# Patient Record
Sex: FEMALE | Race: WHITE | NOT HISPANIC OR LATINO | Employment: PART TIME | URBAN - METROPOLITAN AREA
[De-identification: names, ages, dates, MRNs, and addresses within clinical notes are randomized per-mention and may not be internally consistent; named-entity substitution may affect disease eponyms.]

---

## 2020-03-09 ENCOUNTER — COMMUNICATION - HEALTHEAST (OUTPATIENT)
Dept: SCHEDULING | Facility: CLINIC | Age: 60
End: 2020-03-09

## 2020-03-10 ENCOUNTER — OFFICE VISIT - HEALTHEAST (OUTPATIENT)
Dept: FAMILY MEDICINE | Facility: CLINIC | Age: 60
End: 2020-03-10

## 2020-03-10 DIAGNOSIS — H69.92 DYSFUNCTION OF LEFT EUSTACHIAN TUBE: ICD-10-CM

## 2020-03-10 DIAGNOSIS — H65.192 OTHER NON-RECURRENT ACUTE NONSUPPURATIVE OTITIS MEDIA OF LEFT EAR: ICD-10-CM

## 2020-09-09 ENCOUNTER — COMMUNICATION - HEALTHEAST (OUTPATIENT)
Dept: INTERNAL MEDICINE | Facility: CLINIC | Age: 60
End: 2020-09-09

## 2021-06-04 VITALS
TEMPERATURE: 98.5 F | HEART RATE: 87 BPM | RESPIRATION RATE: 14 BRPM | DIASTOLIC BLOOD PRESSURE: 84 MMHG | WEIGHT: 174 LBS | OXYGEN SATURATION: 95 % | SYSTOLIC BLOOD PRESSURE: 138 MMHG

## 2021-06-06 NOTE — TELEPHONE ENCOUNTER
Who is calling:  Patient   Reason for Call:  Wanting to see if Dr. Cesar is able to take on a new patient. Patient stated that her mother, Ingris Merino is one of her patients and referred her as a Dr.  Date of last appointment with primary care: patient would be new  Okay to leave a detailed message: Yes

## 2021-06-11 NOTE — TELEPHONE ENCOUNTER
New Appointment Needed  What is the reason for the visit:    Patient was scheduled 9/11 with you but needed to cancel due to work conflict, you have reached your daily max of 2 or 3 Physical a day. Wondering if you are willing to squeeze her in a different day?  Provider Preference: Dr. Cesar  How soon do you need to be seen?: Next available  Waitlist offered?: No  Okay to leave a detailed message:  Yes

## 2021-06-11 NOTE — TELEPHONE ENCOUNTER
Who is calling:  Patient   Reason for Call:  Est care physical with Dr. Cesar ok per previous encounter note -- See below  Date of last appointment with primary care:   Okay to leave a detailed message: Yes    Patient is returning call. Patient would like her establish care physical with Dr. Cesar on 11/18 at 1pm.     Writer tried to schedule the appointment but wasn't able to due to not having override access.     Please scheduled physical appointment on 11/18 at 1pm with Dr. Cesar as care connection / contact center is not able to due to not having override access.     Please call patient to notify that appointment has been scheduled.

## 2021-06-18 NOTE — PATIENT INSTRUCTIONS - HE
Patient Instructions by Isidro Zambrano PA-C at 3/10/2020  7:40 AM     Author: Isidro Zambrano PA-C Service: -- Author Type: Physician Assistant    Filed: 3/10/2020  8:07 AM Encounter Date: 3/10/2020 Status: Signed    : Isidro Zambrano PA-C (Physician Assistant)       Use of over-the-counter Zyrtec.  Follow packaging directions  Use of over-the-counter Flonase  Frequent swallowing drinking and chewing gum to help create low-grade suction on the eustachian tube.  Elevate head at nighttime so it does not increase pressure  Use of over-the-counter Tylenol as needed for comfort.  Return to primary care provider for reevaluation treatment if any complication or new symptoms should present.        Patient Education     Earache, No Infection (Adult)  Earaches can happen without an infection. This occurs when air and fluid build up behind the eardrum causing a feeling of fullness and discomfort and reduced hearing. This is called otitis media with effusion (OME) or serous otitis media. It means there is fluid in the middle ear. It is not the same as acute otitis media, which is typically from infection.  OME can happen when you have a cold if congestion blocks the passage that drains the middle ear. This passage is called the eustachian tube. OME may also occur with nasal allergies or after a bacterial middle ear infection.    The pain or discomfort may come and go. You may hear clicking or popping sounds when you chew or swallow. You may feel that your balance is off. Or you may hear ringing in the ear.  It often takes from several weeks up to 3 months for the fluid to clear on its own. Oral pain relievers and ear drops help if there is pain. Decongestants and antihistamines sometimes help. Antibiotics don't help since there is no infection. Your doctor may prescribe a nasal spray to help reduce swelling in the nose and eustachian tube. This can allow the ear to drain.  If your OME doesn't improve after 3 months, surgery  may be used to drain the fluid and insert a small tube in the eardrum to allow continued drainage.  Because the middle ear fluid can become infected, it is important to watch for signs of an ear infection which may develop later. These signs include increased ear pain, fever, or drainage from the ear.  Home care  The following guidelines will help you care for yourself at home:    You may use over-the-counter medicine as directed to control pain, unless another medicine was prescribed. If you have chronic liver or kidney disease or ever had a stomach ulcer or GI bleeding, talk with your doctor before using these medicines. Aspirin should never be used in anyone under 18 years of age who is ill with a fever. It may cause severe liver damage.    You may use over-the-counter decongestants such as phenylephrine or pseudoephedrine. But they are not always helpful. Don't use nasal spray decongestants more than 3 days. Longer use can make congestion worse. Prescription nasal sprays from your doctor don't typically have those restrictions.    Antihistamines may help if you are also having allergy symptoms.    You may use medicines such as guaifenesin to thin mucus and promote drainage.  Follow-up care  Follow up with your healthcare provider or as advised if you are not feeling better after 3 days.  When to seek medical advice  Call your healthcare provider right away if any of the following occur:    Your ear pain gets worse or does not start to improve     Fever of 100.4 F (38 C) or higher, or as directed by your healthcare provider    Fluid or blood draining from the ear    Headache or sinus pain    Stiff neck    Unusual drowsiness or confusion  Date Last Reviewed: 10/1/2016    7421-0534 [employer]. [address] [place] [address], [place] [address]. All rights reserved. This information is not intended as a substitute for professional medical care. Always follow your healthcare professional's instructions.

## 2021-06-28 NOTE — PROGRESS NOTES
Progress Notes by Isidro Zambrano PA-C at 3/10/2020  7:40 AM     Author: Isidro Zambrano PA-C Service: -- Author Type: Physician Assistant    Filed: 3/10/2020  8:24 AM Encounter Date: 3/10/2020 Status: Signed    : Isidro Zambrano PA-C (Physician Assistant)       Subjective:      Patient ID: Ethel Morales is a 59 y.o. female.    Chief Complaint:    HPI  Ethel Morales is a 59 y.o. female who presents today complaining of left-sided ear pain over the last week.  Patient denies any history of allergies.  She does not have runny nose cough sneezing congestion or cold or flulike symptoms.  She has had a history of sinus problems in the past.  She does not feel that she has any pressure over the maxillary sinuses but may be over the frontal sphenoid or ethmoid.  She has not tried treatment for this.  She does complain of popping or clicking when she chews and swallows and she also has decreased hearing on the left side.  No overt otorrhea hearing loss balance deficits or other complaints to address such as fever chills or night sweats.  She has not tried treatment for this over-the-counter.      No past medical history on file.    No past surgical history on file.    No family history on file.    Social History     Tobacco Use   ? Smoking status: Never Smoker   ? Smokeless tobacco: Never Used   Substance Use Topics   ? Alcohol use: Not on file   ? Drug use: Not on file       Review of Systems  As above in HPI, otherwise balance of Review of Systems are negative.    Objective:     /84   Pulse 87   Temp 98.5  F (36.9  C)   Resp 14   Wt 174 lb (78.9 kg)   SpO2 95%   Breastfeeding No     Physical Exam  General: Patient is resting comfortably no acute distress is afebrile  HEENT: Head is normocephalic atraumatic   eyes are PERRL EOMI sclera anicteric   TMs on the right is clear  EAC on the left is clear  TM on the left is with fluid in the middle ear and slight early erythema.  No overt effusion  EAC on the  left is with no cerumen  Throat is without pharyngeal wall erythema and no exudate  No cervical lymphadenopathy present  LUNGS: Clear to auscultation bilaterally  HEART: Regular rate and rhythm  Skin: Without rash non-diaphoretic        Assessment:     Procedures    The primary encounter diagnosis was Dysfunction of left eustachian tube. A diagnosis of Other non-recurrent acute nonsuppurative otitis media of left ear was also pertinent to this visit.    Plan:     1. Dysfunction of left eustachian tube  fluticasone propionate (FLONASE ALLERGY RELIEF) 50 mcg/actuation nasal spray    cetirizine (ZYRTEC) 10 MG tablet   2. Other non-recurrent acute nonsuppurative otitis media of left ear  amoxicillin (AMOXIL) 875 MG tablet       Had a conversation with the patient stating that she does have eustachian tube dysfunction bilaterally.  We will treat her for that.  Handout and conversation were gone over regarding that treatment.  Additionally we talked about holding a prescription for an antibiotic for ear infection.  Patient was requesting a treatment for an ear infection but advised her that the antibiotic would not help with congestion.  She will hold it if she should develop signs or symptoms of an ear infection she will then use the antibiotic and fill it at that later date.  Questions were answered to patient's satisfaction before discharge.    Patient Instructions   Use of over-the-counter Zyrtec.  Follow packaging directions  Use of over-the-counter Flonase  Frequent swallowing drinking and chewing gum to help create low-grade suction on the eustachian tube.  Elevate head at nighttime so it does not increase pressure  Use of over-the-counter Tylenol as needed for comfort.  Return to primary care provider for reevaluation treatment if any complication or new symptoms should present.        Patient Education     Earache, No Infection (Adult)  Earaches can happen without an infection. This occurs when air and fluid  build up behind the eardrum causing a feeling of fullness and discomfort and reduced hearing. This is called otitis media with effusion (OME) or serous otitis media. It means there is fluid in the middle ear. It is not the same as acute otitis media, which is typically from infection.  OME can happen when you have a cold if congestion blocks the passage that drains the middle ear. This passage is called the eustachian tube. OME may also occur with nasal allergies or after a bacterial middle ear infection.    The pain or discomfort may come and go. You may hear clicking or popping sounds when you chew or swallow. You may feel that your balance is off. Or you may hear ringing in the ear.  It often takes from several weeks up to 3 months for the fluid to clear on its own. Oral pain relievers and ear drops help if there is pain. Decongestants and antihistamines sometimes help. Antibiotics don't help since there is no infection. Your doctor may prescribe a nasal spray to help reduce swelling in the nose and eustachian tube. This can allow the ear to drain.  If your OME doesn't improve after 3 months, surgery may be used to drain the fluid and insert a small tube in the eardrum to allow continued drainage.  Because the middle ear fluid can become infected, it is important to watch for signs of an ear infection which may develop later. These signs include increased ear pain, fever, or drainage from the ear.  Home care  The following guidelines will help you care for yourself at home:    You may use over-the-counter medicine as directed to control pain, unless another medicine was prescribed. If you have chronic liver or kidney disease or ever had a stomach ulcer or GI bleeding, talk with your doctor before using these medicines. Aspirin should never be used in anyone under 18 years of age who is ill with a fever. It may cause severe liver damage.    You may use over-the-counter decongestants such as phenylephrine or  pseudoephedrine. But they are not always helpful. Don't use nasal spray decongestants more than 3 days. Longer use can make congestion worse. Prescription nasal sprays from your doctor don't typically have those restrictions.    Antihistamines may help if you are also having allergy symptoms.    You may use medicines such as guaifenesin to thin mucus and promote drainage.  Follow-up care  Follow up with your healthcare provider or as advised if you are not feeling better after 3 days.  When to seek medical advice  Call your healthcare provider right away if any of the following occur:    Your ear pain gets worse or does not start to improve     Fever of 100.4 F (38 C) or higher, or as directed by your healthcare provider    Fluid or blood draining from the ear    Headache or sinus pain    Stiff neck    Unusual drowsiness or confusion  Date Last Reviewed: 10/1/2016    3417-8572 [employer]. [address] [place] [address], [place] [address]. All rights reserved. This information is not intended as a substitute for professional medical care. Always follow your healthcare professional's instructions.

## 2021-07-15 ENCOUNTER — TRANSFERRED RECORDS (OUTPATIENT)
Dept: HEALTH INFORMATION MANAGEMENT | Facility: CLINIC | Age: 61
End: 2021-07-15

## 2021-07-29 ENCOUNTER — TRANSFERRED RECORDS (OUTPATIENT)
Dept: HEALTH INFORMATION MANAGEMENT | Facility: CLINIC | Age: 61
End: 2021-07-29

## 2021-11-18 ENCOUNTER — APPOINTMENT (OUTPATIENT)
Dept: URBAN - METROPOLITAN AREA CLINIC 260 | Age: 61
Setting detail: DERMATOLOGY
End: 2021-11-29

## 2021-11-18 VITALS — RESPIRATION RATE: 16 BRPM | WEIGHT: 165 LBS | HEIGHT: 67 IN

## 2021-11-18 DIAGNOSIS — L30.9 DERMATITIS, UNSPECIFIED: ICD-10-CM

## 2021-11-18 PROCEDURE — OTHER PRESCRIPTION: OTHER

## 2021-11-18 PROCEDURE — OTHER PRESCRIPTION MEDICATION MANAGEMENT: OTHER

## 2021-11-18 PROCEDURE — OTHER COUNSELING: OTHER

## 2021-11-18 PROCEDURE — OTHER MIPS QUALITY: OTHER

## 2021-11-18 PROCEDURE — 99204 OFFICE O/P NEW MOD 45 MIN: CPT

## 2021-11-18 RX ORDER — TRIAMCINOLONE ACETONIDE 1 MG/G
CREAM TOPICAL TWICE DAILY
Qty: 80 | Refills: 2 | Status: ERX | COMMUNITY
Start: 2021-11-18

## 2021-11-18 ASSESSMENT — LOCATION DETAILED DESCRIPTION DERM
LOCATION DETAILED: RIGHT LATERAL ABDOMEN
LOCATION DETAILED: LEFT MEDIAL BREAST 10-11:00 REGION
LOCATION DETAILED: RIGHT PROXIMAL POSTERIOR THIGH

## 2021-11-18 ASSESSMENT — LOCATION SIMPLE DESCRIPTION DERM
LOCATION SIMPLE: RIGHT POSTERIOR THIGH
LOCATION SIMPLE: LEFT BREAST
LOCATION SIMPLE: ABDOMEN

## 2021-11-18 ASSESSMENT — LOCATION ZONE DERM
LOCATION ZONE: TRUNK
LOCATION ZONE: LEG

## 2021-11-18 NOTE — HPI: RASH
What Type Of Note Output Would You Prefer (Optional)?: Standard Output
How Severe Is Your Rash?: mild
Is This A New Presentation, Or A Follow-Up?: Rash
Additional History: Patient reports this rash started appear on Sunday night on her right hip area then spreading to her chest and thighs areas.  Patient also reports, she was in Hotel in [place] on Sunday night with her  but her  doesn’t get any rash. Patient trying to wash it with alcohol and it helps calm down the itching away but it does not help taking the rash away. Patient also tried triple antibiotics with a little improvement.

## 2021-11-18 NOTE — PROCEDURE: PRESCRIPTION MEDICATION MANAGEMENT
Plan: - Avoid scratching \\n- Recommended Sarna lotion for itching \\n- Follow up in 2 weeks
Initiate Treatment: Triamcinolone 0.1% cream
Samples Given: Sarna lotions
Render In Strict Bullet Format?: No
Detail Level: Zone

## 2022-02-15 ENCOUNTER — OFFICE VISIT (OUTPATIENT)
Dept: FAMILY MEDICINE | Facility: CLINIC | Age: 62
End: 2022-02-15
Payer: COMMERCIAL

## 2022-02-15 VITALS
OXYGEN SATURATION: 99 % | HEART RATE: 58 BPM | RESPIRATION RATE: 16 BRPM | SYSTOLIC BLOOD PRESSURE: 149 MMHG | WEIGHT: 168 LBS | DIASTOLIC BLOOD PRESSURE: 80 MMHG | TEMPERATURE: 97.6 F

## 2022-02-15 DIAGNOSIS — L29.2 VULVOVAGINAL PRURITUS: Primary | ICD-10-CM

## 2022-02-15 DIAGNOSIS — N89.8 VAGINAL ITCHING: ICD-10-CM

## 2022-02-15 LAB
ALBUMIN UR-MCNC: NEGATIVE MG/DL
APPEARANCE UR: CLEAR
BACTERIA #/AREA URNS HPF: ABNORMAL /HPF
BILIRUB UR QL STRIP: NEGATIVE
CLUE CELLS: ABNORMAL
COLOR UR AUTO: YELLOW
GLUCOSE UR STRIP-MCNC: NEGATIVE MG/DL
HGB UR QL STRIP: NEGATIVE
KETONES UR STRIP-MCNC: NEGATIVE MG/DL
LEUKOCYTE ESTERASE UR QL STRIP: ABNORMAL
NITRATE UR QL: NEGATIVE
PH UR STRIP: 6.5 [PH] (ref 5–8)
RBC #/AREA URNS AUTO: ABNORMAL /HPF
SP GR UR STRIP: 1.01 (ref 1–1.03)
SQUAMOUS #/AREA URNS AUTO: ABNORMAL /LPF
TRICHOMONAS, WET PREP: ABNORMAL
UROBILINOGEN UR STRIP-ACNC: 0.2 E.U./DL
WBC #/AREA URNS AUTO: ABNORMAL /HPF
WBC'S/HIGH POWER FIELD, WET PREP: ABNORMAL
YEAST, WET PREP: ABNORMAL

## 2022-02-15 PROCEDURE — 81001 URINALYSIS AUTO W/SCOPE: CPT | Performed by: PHYSICIAN ASSISTANT

## 2022-02-15 PROCEDURE — 87210 SMEAR WET MOUNT SALINE/INK: CPT | Performed by: PHYSICIAN ASSISTANT

## 2022-02-15 PROCEDURE — 99213 OFFICE O/P EST LOW 20 MIN: CPT | Performed by: PHYSICIAN ASSISTANT

## 2022-02-15 RX ORDER — KETOCONAZOLE 20 MG/G
CREAM TOPICAL 2 TIMES DAILY
Qty: 30 G | Refills: 0 | Status: SHIPPED | OUTPATIENT
Start: 2022-02-15 | End: 2022-02-22

## 2022-02-15 NOTE — PROGRESS NOTES
Patient presents with:  Vaginal Problem: itching burning started on thursday       Clinical Decision Making:  A conversation with the patient stating that the urine test and the wet prep were returned as negative.  Patient will be treated empirically for vulvar vaginal vaginitis due to the redness and itching externally.  Patient did not admit to having lesions or vesicles that would be amenable to a culture.  Patient declined need for STD screening.  She was accompanied by her  in the office.  The  did not admit to any current symptoms. If the patient is not responding to empiric treatment she should follow up with her primary care provider in 1 week for reevaluation and treatment sooner if complications or new concerns arise in the interim.      ICD-10-CM    1. Vulvovaginal pruritus  L29.2 ketoconazole (NIZORAL) 2 % external cream   2. Vaginal itching  N89.8 UA macro with reflex to Microscopic and Culture - Clinc Collect     Wet prep - Clinic Collect     Urine Microscopic     ketoconazole (NIZORAL) 2 % external cream       Patient Instructions   Keep the area clean dry and protected  Apply the topical cream as directed.  Follow-up with your primary care provider if not getting Roberto Carlos percent resolution of new symptoms or concerns arise      Patient Education     Yeast Infection (Candida Vaginal Infection)    You have a Candida vaginal infection. This is also known as a yeast infection. It's most often caused by a type of yeast (fungus) called Candida. Candida are normally found in the vagina. But if they increase in number, this can lead to infection and cause symptoms.   Symptoms of a yeast infection can include:     Clumpy or thin, white discharge, which may look like cottage cheese    Itching or burning    Burning with urination  Certain factors can make a yeast infection more likely. These can include:     Taking certain medicines, such as antibiotics or birth control  pills    Pregnancy    Diabetes    Weak immune system  A yeast infection is most often treated with antifungal medicine. This may be given as a vaginal cream or pills you take by mouth. Treatment may last for about 1 to 7 days. Women with severe or recurrent infections may need longer courses of treatment.   Home care    If you re prescribed medicine, be sure to use it as directed. Finish all of the medicine, even if your symptoms go away. Don t try to treat yourself using over-the-counter products without talking with your provider first. They will let you know if this is a good option for you.    Ask your provider what steps you can take to help reduce your risk of having a yeast infection in the future.    Follow-up care  Follow up with your healthcare provider, or as directed.   When to seek medical advice  Call your healthcare provider right away if:     You have a fever of 100.4 F (38 C) or higher, or as directed by your provider.    Your symptoms worsen, or they don t go away within a few days of starting treatment.    You have new pain in the lower belly or pelvic region.    You have side effects that bother you or a reaction to the cream or pills you re prescribed.    You or any partners you have sex with have new symptoms, such as a rash, joint pain, or sores.  [employer] last reviewed this educational content on 7/1/2020 2000-2021 [employer]. All rights reserved. This information is not intended as a substitute for professional medical care. Always follow your healthcare professional's instructions.               HPI:  Ethel Morales is a 61 year old female who presents today for evaluation of vulvar redness and itching.  Patient does not admit to urinary symptoms to include urinary hesitancy urgency frequency but does have slight dysuria.  She has had some slight dysuria at the end of her urinary stream with a described burning at the outside portion of the urethra.  She has not had  vaginal discharge or odor.  No dyspareunia.  She is  to her  and has no other sexual partner.  Her  accompanies her in the office today and both the patient and  declined STD screening also staying with the  does not have symptoms or redness or lesions on the penis or scrotum at this time.    History obtained from chart review and the patient.    Problem List:  There are no relevant problems documented for this patient.      No past medical history on file.    Social History     Tobacco Use     Smoking status: Never Smoker     Smokeless tobacco: Never Used   Substance Use Topics     Alcohol use: Not on file       Review of Systems  As above in HPI otherwise negative.    Vitals:    02/15/22 1611   BP: (!) 149/80   Pulse: 58   Resp: 16   Temp: 97.6  F (36.4  C)   TempSrc: Oral   SpO2: 99%   Weight: 76.2 kg (168 lb)       General: Patient is resting comfortably no acute distress is afebrile  HEENT: Head is normocephalic atraumatic   eyes are PERRL EOMI sclera anicteric   Abdomen: Nontender nondistended no rebound no guarding no masses no suprapubic tenderness to palpation no rebound or guarding no masses no CVA tenderness to percussion  Skin: Without rash non-diaphoretic  : Deferred at request of the patient in the office today.    Physical Exam      Labs:  Results for orders placed or performed in visit on 02/15/22   UA macro with reflex to Microscopic and Culture - Clinc Collect     Status: Abnormal    Specimen: Urine, Clean Catch   Result Value Ref Range    Color Urine Yellow Colorless, Straw, Light Yellow, Yellow    Appearance Urine Clear Clear    Glucose Urine Negative Negative mg/dL    Bilirubin Urine Negative Negative    Ketones Urine Negative Negative mg/dL    Specific Gravity Urine 1.015 1.005 - 1.030    Blood Urine Negative Negative    pH Urine 6.5 5.0 - 8.0    Protein Albumin Urine Negative Negative mg/dL    Urobilinogen Urine 0.2 0.2, 1.0 E.U./dL    Nitrite Urine  Negative Negative    Leukocyte Esterase Urine Trace (A) Negative   Urine Microscopic     Status: Abnormal   Result Value Ref Range    Bacteria Urine None Seen None Seen /HPF    RBC Urine 0-2 0-2 /HPF /HPF    WBC Urine None Seen 0-5 /HPF /HPF    Squamous Epithelials Urine Few (A) None Seen /LPF    Narrative    Urine Culture not indicated   Wet prep - Clinic Collect     Status: Abnormal    Specimen: Vagina; Swab   Result Value Ref Range    Trichomonas Absent Absent    Yeast Absent Absent    Clue Cells Absent Absent    WBCs/high power field 2+ (A) None       At the end of the encounter, I discussed results, diagnosis, medications. Discussed red flags for immediate return to clinic/ER, as well as indications for follow up if no improvement. Patient understood and agreed to plan. Patient was stable for discharge.

## 2022-02-15 NOTE — PATIENT INSTRUCTIONS
Keep the area clean dry and protected  Apply the topical cream as directed.  Follow-up with your primary care provider if not getting Roberto Carlos percent resolution of new symptoms or concerns arise      Patient Education     Yeast Infection (Candida Vaginal Infection)    You have a Candida vaginal infection. This is also known as a yeast infection. It's most often caused by a type of yeast (fungus) called Candida. Candida are normally found in the vagina. But if they increase in number, this can lead to infection and cause symptoms.   Symptoms of a yeast infection can include:     Clumpy or thin, white discharge, which may look like cottage cheese    Itching or burning    Burning with urination  Certain factors can make a yeast infection more likely. These can include:     Taking certain medicines, such as antibiotics or birth control pills    Pregnancy    Diabetes    Weak immune system  A yeast infection is most often treated with antifungal medicine. This may be given as a vaginal cream or pills you take by mouth. Treatment may last for about 1 to 7 days. Women with severe or recurrent infections may need longer courses of treatment.   Home care    If you re prescribed medicine, be sure to use it as directed. Finish all of the medicine, even if your symptoms go away. Don t try to treat yourself using over-the-counter products without talking with your provider first. They will let you know if this is a good option for you.    Ask your provider what steps you can take to help reduce your risk of having a yeast infection in the future.    Follow-up care  Follow up with your healthcare provider, or as directed.   When to seek medical advice  Call your healthcare provider right away if:     You have a fever of 100.4 F (38 C) or higher, or as directed by your provider.    Your symptoms worsen, or they don t go away within a few days of starting treatment.    You have new pain in the lower belly or pelvic region.    You  have side effects that bother you or a reaction to the cream or pills you re prescribed.    You or any partners you have sex with have new symptoms, such as a rash, joint pain, or sores.  [employer] last reviewed this educational content on 7/1/2020 2000-2021 [employer]. All rights reserved. This information is not intended as a substitute for professional medical care. Always follow your healthcare professional's instructions.

## 2023-05-27 ENCOUNTER — HEALTH MAINTENANCE LETTER (OUTPATIENT)
Age: 63
End: 2023-05-27

## 2024-08-04 ENCOUNTER — HEALTH MAINTENANCE LETTER (OUTPATIENT)
Age: 64
End: 2024-08-04